# Patient Record
Sex: MALE | NOT HISPANIC OR LATINO
[De-identification: names, ages, dates, MRNs, and addresses within clinical notes are randomized per-mention and may not be internally consistent; named-entity substitution may affect disease eponyms.]

---

## 2018-04-30 ENCOUNTER — HOSPITAL (OUTPATIENT)
Dept: OTHER | Age: 23
End: 2018-04-30

## 2018-04-30 LAB
AMPHETAMINES UR QL SCN>500 NG/ML: NEGATIVE
ANALYZER ANC (IANC): ABNORMAL
ANION GAP SERPL CALC-SCNC: 14 MMOL/L (ref 10–20)
BARBITURATES UR QL SCN>200 NG/ML: NEGATIVE
BASOPHILS # BLD: 0 THOUSAND/MCL (ref 0–0.3)
BASOPHILS NFR BLD: 0 %
BENZODIAZ UR QL SCN>200 NG/ML: NEGATIVE
BUN SERPL-MCNC: 23 MG/DL (ref 6–20)
BUN/CREAT SERPL: 24 (ref 7–25)
BZE UR QL SCN>150 NG/ML: NEGATIVE
CALCIUM SERPL-MCNC: 8.7 MG/DL (ref 8.4–10.2)
CANNABINOIDS UR QL SCN>50 NG/ML: NEGATIVE
CHLORIDE: 103 MMOL/L (ref 98–107)
CO2 SERPL-SCNC: 28 MMOL/L (ref 21–32)
CREAT SERPL-MCNC: 0.94 MG/DL (ref 0.67–1.17)
DIFFERENTIAL METHOD BLD: ABNORMAL
EOSINOPHIL # BLD: 0.2 THOUSAND/MCL (ref 0.1–0.5)
EOSINOPHIL NFR BLD: 3 %
ERYTHROCYTE [DISTWIDTH] IN BLOOD: 14.9 % (ref 11–15)
ETHANOL SERPL-MCNC: NORMAL MG/DL
GLUCOSE SERPL-MCNC: 119 MG/DL (ref 65–99)
HEMATOCRIT: 36.4 % (ref 39–51)
HGB BLD-MCNC: 11.7 GM/DL (ref 13–17)
LYMPHOCYTES # BLD: 1.9 THOUSAND/MCL (ref 1–4.8)
LYMPHOCYTES NFR BLD: 33 %
MCH RBC QN AUTO: 20 PG (ref 26–34)
MCHC RBC AUTO-ENTMCNC: 32.1 GM/DL (ref 32–36.5)
MCV RBC AUTO: 62.1 FL (ref 78–100)
METHADONE UR QL SCN>300 NG/ML: NEGATIVE NG/ML
MONOCYTES # BLD: 0.4 THOUSAND/MCL (ref 0.3–0.9)
MONOCYTES NFR BLD: 7 %
NEUTROPHILS # BLD: 3.3 THOUSAND/MCL (ref 1.8–7.7)
NEUTROPHILS NFR BLD: 57 %
NEUTS SEG NFR BLD: ABNORMAL %
OPIATES UR QL SCN>300 NG/ML: NEGATIVE
PCP UR QL SCN>25 NG/ML: NEGATIVE
PERCENT NRBC: ABNORMAL
PLATELET # BLD: 103 THOUSAND/MCL (ref 140–450)
POTASSIUM SERPL-SCNC: 3.7 MMOL/L (ref 3.4–5.1)
RBC # BLD: 5.86 MILLION/MCL (ref 4.5–5.9)
SODIUM SERPL-SCNC: 141 MMOL/L (ref 135–145)
WBC # BLD: 5.9 THOUSAND/MCL (ref 4.2–11)

## 2024-08-21 ENCOUNTER — APPOINTMENT (OUTPATIENT)
Dept: GENERAL RADIOLOGY | Age: 29
End: 2024-08-21
Attending: NURSE PRACTITIONER
Payer: OTHER MISCELLANEOUS

## 2024-08-21 ENCOUNTER — HOSPITAL ENCOUNTER (OUTPATIENT)
Age: 29
Discharge: HOME | End: 2024-08-21
Payer: OTHER MISCELLANEOUS

## 2024-08-21 VITALS
DIASTOLIC BLOOD PRESSURE: 86 MMHG | TEMPERATURE: 98 F | RESPIRATION RATE: 18 BRPM | HEART RATE: 82 BPM | SYSTOLIC BLOOD PRESSURE: 122 MMHG | OXYGEN SATURATION: 98 %

## 2024-08-21 DIAGNOSIS — M25.569 PAIN, KNEE: Primary | ICD-10-CM

## 2024-08-21 DIAGNOSIS — S80.02XA CONTUSION OF LEFT KNEE, INITIAL ENCOUNTER: ICD-10-CM

## 2024-08-21 PROCEDURE — 73562 X-RAY EXAM OF KNEE 3: CPT | Performed by: NURSE PRACTITIONER

## 2024-08-21 NOTE — ED PROVIDER NOTES
Patient Seen in: [facility]    History     Chief Complaint   Patient presents with    Leg or Foot Injury     Stated Complaint: left knee injury    HPI    HPI: Rosaline Mendiola is a 29 year old male who presents after an injury to the left knee   that occurred while at work just pta  . Patient complains 7/10 pain.  Pain better with rest, worse with movement.  no loss of strengths or sensation    History reviewed. No pertinent past medical history.    No past surgical history on file.         History reviewed. No pertinent family history.    Social History     Socioeconomic History    Marital status: Single       Review of Systems    Positive for stated complaint: left knee injury  Other systems are as noted in HPI.  Constitutional and vital signs reviewed.      All other systems reviewed and negative except as noted above.    PSFH elements reviewed from today and agreed except as otherwise stated in HPI.    Physical Exam     ED Triage Vitals [08/21/24 1439]   /86   Pulse 82   Resp 18   Temp 98.4 °F (36.9 °C)   Temp src Oral   SpO2 98 %   O2 Device None (Room air)       Current:/86   Pulse 82   Temp 98.4 °F (36.9 °C) (Oral)   Resp 18   SpO2 98%         Physical Exam      MENTAL STATUS: Alert, oriented, and cooperative. No focal deficit  HEAD: Atraumatic  NECK: Supple, full range of motion without pain or paresthesias  EXTREMITIES: Left anterior knee has a mild amount of swelling, a little bit of ecchymosis.  He has full range of motion.  Positive pulses distal to the injury.  NEURO:Sensation to touch is intact.  SKIN: No open wounds, no rashes.  PSYCH: Normal affect. Calm and cooperative.    Differential diagnosis to include fracture vs. Strain/sprain vs. contusion    ED Course   Labs Reviewed - No data to display  I have personally  reviewed available prior medical records for any recent pertinent discharge summaries/testing. Patient/family updated on results and plan, a verbalized  understanding and agreement with the plan.  I explained to the patient that emergent conditions may arise and to go to the ER for new, worsening or any persistent conditions. I've explained the importance of taking all medicatons as prescribed, follow up, and return precuations,  All questions answered.    Please note that this report has been produced using speech recognition software and may contain errors related to that system including, but not limited to, errors in grammar, punctuation, and spelling, as well as words and phrases that possibly may have been recognized inappropriately.  If there are any questions or concerns, contact the dictating provider for clarification.  [facility]     Radiology result:    XR KNEE (3 VIEWS), LEFT (CPT=73562)    Result Date: 8/21/2024  CONCLUSION:   Negative for fracture or malalignment.  Normal mineralization.  Joint spaces are preserved.  No patellar subluxation.  No joint effusion or focal soft tissue swelling.     LOCATION:  [facility]   Dictated by (CST): Sandra Anderson MD on 8/21/2024 at 3:11 PM     Finalized by (CST): Sandra Anderson MD on 8/21/2024 at 3:13 PM      Patient presents for injury after a fall.  This was a mechanical fall, no syncope or head injury.  No shortness of breath or chest pain and the patient has oxygen saturation which is within normal limits for this patient.  X-rays were performed which did not reveal any acute fracture or dislocation.  Presentation is clinically consistent with contusion. Instructions given on Rice therapy and over-the-counter medications for pain management. Recommend close follow-up with PCP.  Discussed possibility of missed occult fracture and need for repeat imaging if pain is persistent after 2 weeks.  Return to ED precautions discussed with the patient.      Disposition and Plan     Clinical Impression:  1. Pain, knee    2. Contusion of left knee, initial encounter        Disposition:  Discharge    Follow-up:  Ottoniel Causey MD  [id number] [address]  [phone]            Medications Prescribed:  There are no discharge medications for this patient.

## (undated) NOTE — LETTER
Date & Time: 8/21/2024, 3:24 PM  Patient: Rosaline Mendiola  Encounter Provider(s):    Maria D Moran APRN       To Whom It May Concern:    Rosaline Mendiola was seen and treated in our department on 8/21/2024. He can return to work 08/26/2024.    If you have any questions or concerns, please do not hesitate to call.        _____________________________  Physician/APC Signature